# Patient Record
(demographics unavailable — no encounter records)

---

## 2024-12-03 NOTE — PHYSICAL EXAM
[Midline] : trachea located in midline position [Hearing Calvert Test (Tuning Fork On Forehead)] : no lateralization of tone [Normal] : gait was normal [Nystagmus] : ~T ~M nystagmus was seen [Fukuda Step Test] : Fukuda Step Test was Positive [Lizett-María Elen[place]] : Fillmore-Hallpike: Positive [Hearing Loss Right Only] : normal [Hearing Loss Left Only] : normal [Rinne Test Air Conduction Persists > Bone Conduction Right] : bone conduction greater than air conduction on the right [Rinne Test Air Conduction Persists > Bone Conduction Left] : bone conduction greater than air conduction on the left [Romberg's Sign] : Romberg's sign was absent [Fistula Sign] : Fistula Sign: Negative [Past-Pointing] : Past-Pointing: Negative [FreeTextEntry1] : +DH to L

## 2024-12-03 NOTE — HISTORY OF PRESENT ILLNESS
[de-identified] : 50 year old female with R asym SNHL, R asym peripheral vestibular fxn and h/o CLL presents for follow up of vertigo. s/p CT IAC 10/29/24 States vertigo is improved, no episodes since last visit has not had to try zofran or lorazapam  Getting headaches 2-3 weekly Denies otalgia, otorrhea, tinnitus

## 2024-12-03 NOTE — DATA REVIEWED
[de-identified] : I have independently reviewed the patient's audiogram and VNG  from last week and my findings include R SNHL, SDS at 72% and VNG weakness at 35%